# Patient Record
Sex: FEMALE | Race: OTHER
[De-identification: names, ages, dates, MRNs, and addresses within clinical notes are randomized per-mention and may not be internally consistent; named-entity substitution may affect disease eponyms.]

---

## 2017-11-26 ENCOUNTER — HOSPITAL ENCOUNTER (EMERGENCY)
Dept: HOSPITAL 54 - ER | Age: 37
Discharge: HOME | End: 2017-11-26
Payer: COMMERCIAL

## 2017-11-26 VITALS — DIASTOLIC BLOOD PRESSURE: 71 MMHG | SYSTOLIC BLOOD PRESSURE: 134 MMHG

## 2017-11-26 VITALS — WEIGHT: 120 LBS | HEIGHT: 62 IN | BODY MASS INDEX: 22.08 KG/M2

## 2017-11-26 DIAGNOSIS — Z87.442: ICD-10-CM

## 2017-11-26 DIAGNOSIS — N39.0: ICD-10-CM

## 2017-11-26 DIAGNOSIS — Z88.8: ICD-10-CM

## 2017-11-26 DIAGNOSIS — F17.210: ICD-10-CM

## 2017-11-26 DIAGNOSIS — Z91.013: ICD-10-CM

## 2017-11-26 DIAGNOSIS — F10.10: ICD-10-CM

## 2017-11-26 DIAGNOSIS — Z88.2: ICD-10-CM

## 2017-11-26 DIAGNOSIS — R19.7: ICD-10-CM

## 2017-11-26 DIAGNOSIS — R11.2: Primary | ICD-10-CM

## 2017-11-26 DIAGNOSIS — J45.909: ICD-10-CM

## 2017-11-26 LAB
ALBUMIN SERPL BCP-MCNC: 4.3 G/DL (ref 3.4–5)
ALP SERPL-CCNC: 82 U/L (ref 46–116)
ALT SERPL W P-5'-P-CCNC: 21 U/L (ref 12–78)
APPEARANCE UR: (no result)
AST SERPL W P-5'-P-CCNC: 15 U/L (ref 15–37)
BASOPHILS # BLD AUTO: 0 /CMM (ref 0–0.2)
BASOPHILS NFR BLD AUTO: 0.3 % (ref 0–2)
BILIRUB DIRECT SERPL-MCNC: 0.1 MG/DL (ref 0–0.2)
BILIRUB SERPL-MCNC: 0.3 MG/DL (ref 0.2–1)
BILIRUB UR QL STRIP: NEGATIVE
BUN SERPL-MCNC: 12 MG/DL (ref 7–18)
CALCIUM SERPL-MCNC: 9.4 MG/DL (ref 8.5–10.1)
CHLORIDE SERPL-SCNC: 105 MMOL/L (ref 98–107)
CO2 SERPL-SCNC: 28 MMOL/L (ref 21–32)
COLOR UR: YELLOW
CREAT SERPL-MCNC: 0.7 MG/DL (ref 0.6–1.3)
EOSINOPHIL # BLD AUTO: 0 /CMM (ref 0–0.7)
EOSINOPHIL NFR BLD AUTO: 0.1 % (ref 0–6)
GLUCOSE SERPL-MCNC: 120 MG/DL (ref 74–106)
GLUCOSE UR STRIP-MCNC: NEGATIVE MG/DL
HCT VFR BLD AUTO: 43 % (ref 33–45)
HGB BLD-MCNC: 13.9 G/DL (ref 11.5–14.8)
HGB UR QL STRIP: (no result) ERY/UL
KETONES UR STRIP-MCNC: (no result) MG/DL
LEUKOCYTE ESTERASE UR QL STRIP: (no result)
LIPASE SERPL-CCNC: 93 U/L (ref 73–393)
LYMPHOCYTES NFR BLD AUTO: 1.3 /CMM (ref 0.8–4.8)
LYMPHOCYTES NFR BLD AUTO: 12.3 % (ref 20–44)
MCH RBC QN AUTO: 28 PG (ref 26–33)
MCHC RBC AUTO-ENTMCNC: 33 G/DL (ref 31–36)
MCV RBC AUTO: 85 FL (ref 82–100)
MONOCYTES NFR BLD AUTO: 0.2 /CMM (ref 0.1–1.3)
MONOCYTES NFR BLD AUTO: 1.7 % (ref 2–12)
NEUTROPHILS # BLD AUTO: 9.5 /CMM (ref 1.8–8.9)
NEUTROPHILS NFR BLD AUTO: 85.6 % (ref 43–81)
NITRITE UR QL STRIP: NEGATIVE
PH UR STRIP: 8 [PH] (ref 5–8)
PLATELET # BLD AUTO: 323 /CMM (ref 150–450)
POTASSIUM SERPL-SCNC: 3.8 MMOL/L (ref 3.5–5.1)
PROT SERPL-MCNC: 7.8 G/DL (ref 6.4–8.2)
PROT UR QL STRIP: (no result) MG/DL
RBC # BLD AUTO: 5.02 MIL/UL (ref 4–5.2)
RBC #/AREA URNS HPF: (no result) /HPF (ref 0–2)
RDW COEFFICIENT OF VARIATION: 12.5 (ref 11.5–15)
SODIUM SERPL-SCNC: 141 MMOL/L (ref 136–145)
UROBILINOGEN UR STRIP-MCNC: 0.2 EU/DL
WBC #/AREA URNS HPF: (no result) /HPF
WBC #/AREA URNS HPF: (no result) /HPF (ref 0–3)
WBC NRBC COR # BLD AUTO: 11 K/UL (ref 4.3–11)

## 2017-11-26 PROCEDURE — Z7610: HCPCS

## 2017-11-26 PROCEDURE — 80048 BASIC METABOLIC PNL TOTAL CA: CPT

## 2017-11-26 PROCEDURE — 96361 HYDRATE IV INFUSION ADD-ON: CPT

## 2017-11-26 PROCEDURE — 36415 COLL VENOUS BLD VENIPUNCTURE: CPT

## 2017-11-26 PROCEDURE — 99284 EMERGENCY DEPT VISIT MOD MDM: CPT

## 2017-11-26 PROCEDURE — 83690 ASSAY OF LIPASE: CPT

## 2017-11-26 PROCEDURE — 81001 URINALYSIS AUTO W/SCOPE: CPT

## 2017-11-26 PROCEDURE — 96374 THER/PROPH/DIAG INJ IV PUSH: CPT

## 2017-11-26 PROCEDURE — 99406 BEHAV CHNG SMOKING 3-10 MIN: CPT

## 2017-11-26 PROCEDURE — 87086 URINE CULTURE/COLONY COUNT: CPT

## 2017-11-26 PROCEDURE — 85025 COMPLETE CBC W/AUTO DIFF WBC: CPT

## 2017-11-26 PROCEDURE — A4606 OXYGEN PROBE USED W OXIMETER: HCPCS

## 2017-11-26 PROCEDURE — 80076 HEPATIC FUNCTION PANEL: CPT

## 2017-11-26 PROCEDURE — 84703 CHORIONIC GONADOTROPIN ASSAY: CPT

## 2017-11-26 NOTE — NUR
PT. VERBALIZED UNDERSTANDING OF AFTERCARE INSTRUCTIONS.Patient discharged to 
home in stable condition. Written and verbal after care instructions given. 
Patient verbalizes understanding of instruction.

## 2018-05-12 ENCOUNTER — HOSPITAL ENCOUNTER (EMERGENCY)
Dept: HOSPITAL 54 - ER | Age: 38
Discharge: HOME | End: 2018-05-12
Payer: COMMERCIAL

## 2018-05-12 VITALS — SYSTOLIC BLOOD PRESSURE: 113 MMHG | DIASTOLIC BLOOD PRESSURE: 68 MMHG

## 2018-05-12 VITALS — BODY MASS INDEX: 22.63 KG/M2 | HEIGHT: 62 IN | WEIGHT: 123 LBS

## 2018-05-12 DIAGNOSIS — J45.909: ICD-10-CM

## 2018-05-12 DIAGNOSIS — Z88.6: ICD-10-CM

## 2018-05-12 DIAGNOSIS — Z91.013: ICD-10-CM

## 2018-05-12 DIAGNOSIS — Z88.2: ICD-10-CM

## 2018-05-12 DIAGNOSIS — Z88.1: ICD-10-CM

## 2018-05-12 DIAGNOSIS — Z87.442: ICD-10-CM

## 2018-05-12 DIAGNOSIS — N12: Primary | ICD-10-CM

## 2018-05-12 LAB
BASOPHILS # BLD AUTO: 0.1 /CMM (ref 0–0.2)
BASOPHILS NFR BLD AUTO: 0.5 % (ref 0–2)
BILIRUB UR QL STRIP: (no result)
BUN SERPL-MCNC: 18 MG/DL (ref 7–18)
CALCIUM SERPL-MCNC: 9 MG/DL (ref 8.5–10.1)
CHLORIDE SERPL-SCNC: 104 MMOL/L (ref 98–107)
CO2 SERPL-SCNC: 30 MMOL/L (ref 21–32)
CREAT SERPL-MCNC: 0.8 MG/DL (ref 0.6–1.3)
EOSINOPHIL NFR BLD AUTO: 5.9 % (ref 0–6)
GLUCOSE SERPL-MCNC: 89 MG/DL (ref 74–106)
GLUCOSE UR STRIP-MCNC: (no result) MG/DL
HCT VFR BLD AUTO: 39 % (ref 33–45)
HGB BLD-MCNC: 13.3 G/DL (ref 11.5–14.8)
LYMPHOCYTES NFR BLD AUTO: 2.8 /CMM (ref 0.8–4.8)
LYMPHOCYTES NFR BLD AUTO: 26.6 % (ref 20–44)
MCHC RBC AUTO-ENTMCNC: 34 G/DL (ref 31–36)
MCV RBC AUTO: 84 FL (ref 82–100)
MONOCYTES NFR BLD AUTO: 0.5 /CMM (ref 0.1–1.3)
MONOCYTES NFR BLD AUTO: 4.5 % (ref 2–12)
NEUTROPHILS # BLD AUTO: 6.7 /CMM (ref 1.8–8.9)
NEUTROPHILS NFR BLD AUTO: 62.5 % (ref 43–81)
PH UR STRIP: 5 [PH] (ref 5–8)
PLATELET # BLD AUTO: 379 /CMM (ref 150–450)
POTASSIUM SERPL-SCNC: 3.7 MMOL/L (ref 3.5–5.1)
PROT UR QL STRIP: 100 MG/DL
RBC # BLD AUTO: 4.59 MIL/UL (ref 4–5.2)
RDW COEFFICIENT OF VARIATION: 12.6 (ref 11.5–15)
SODIUM SERPL-SCNC: 138 MMOL/L (ref 136–145)
UROBILINOGEN UR STRIP-MCNC: 4 EU/DL
WBC NRBC COR # BLD AUTO: 10.7 K/UL (ref 4.3–11)

## 2018-05-12 PROCEDURE — A4606 OXYGEN PROBE USED W OXIMETER: HCPCS

## 2018-05-12 PROCEDURE — A4216 STERILE WATER/SALINE, 10 ML: HCPCS

## 2018-05-12 PROCEDURE — Z7610: HCPCS

## 2018-06-01 ENCOUNTER — HOSPITAL ENCOUNTER (EMERGENCY)
Dept: HOSPITAL 54 - ER | Age: 38
LOS: 1 days | Discharge: HOME | End: 2018-06-02
Payer: COMMERCIAL

## 2018-06-01 VITALS — WEIGHT: 125 LBS | HEIGHT: 63 IN | BODY MASS INDEX: 22.15 KG/M2

## 2018-06-01 DIAGNOSIS — Z91.013: ICD-10-CM

## 2018-06-01 DIAGNOSIS — J45.909: ICD-10-CM
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
## 2018-06-01 DIAGNOSIS — T78.02XA: Primary | ICD-10-CM

## 2018-06-01 DIAGNOSIS — Z87.442: ICD-10-CM

## 2018-06-01 DIAGNOSIS — J38.4: ICD-10-CM

## 2018-06-01 DIAGNOSIS — Z88.2: ICD-10-CM

## 2018-06-01 DIAGNOSIS — F10.10: ICD-10-CM

## 2018-06-01 DIAGNOSIS — Z88.5: ICD-10-CM

## 2018-06-01 DIAGNOSIS — Z88.6: ICD-10-CM

## 2018-06-01 DIAGNOSIS — Z88.8: ICD-10-CM

## 2018-06-01 PROCEDURE — A4606 OXYGEN PROBE USED W OXIMETER: HCPCS

## 2018-06-01 NOTE — NUR
PT RESTING QUIETLY, AROUSES EASILY TO VOICE. RN TO CONTINUE MONITORING 
PROVIDING SAFETY/COMFORT MEASURES.

## 2018-06-01 NOTE — NUR
18G IV TO L AC X 1 ATTEMPT USING ASEPTIC TECH. IV FLUSHES EASILY WITH NS, NO 
S/S INFILTRATION NOTED AT THIS TIME.

## 2018-06-01 NOTE — NUR
PT AMBULATORY TO ER BED 14. MD AT BEDSIDE. BIB FRIEND C/O ALLERGIC REACTION 
WITH SOB TO EATING SHRIMP X 30 MIN AGO. PT PLACED IN GOWN AND ON CARDIAC 
MONITOR. VSS/RESP EVEN UNLABORED/NAD NOTED/SKIN WARM AND DRY/DENIES 
N-V-D/AFEBRILE/AOX4.

## 2018-06-02 VITALS — SYSTOLIC BLOOD PRESSURE: 120 MMHG | DIASTOLIC BLOOD PRESSURE: 69 MMHG

## 2018-06-02 NOTE — NUR
IV removed. Catheter intact and site benign. Pressure and 4x4 applied to site. 
No bleeding noted. Patient discharged to home in stable condition. Written and 
verbal after care instructions given. Patient verbalizes understanding of 
instruction. Patient ambulatory with a steady gait.

## 2018-06-24 ENCOUNTER — HOSPITAL ENCOUNTER (EMERGENCY)
Dept: HOSPITAL 54 - ER | Age: 38
LOS: 1 days | Discharge: HOME | End: 2018-06-25
Payer: COMMERCIAL

## 2018-06-24 VITALS — BODY MASS INDEX: 23 KG/M2 | HEIGHT: 62 IN | WEIGHT: 125 LBS

## 2018-06-24 DIAGNOSIS — J45.909: ICD-10-CM
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
## 2018-06-24 DIAGNOSIS — Z88.2: ICD-10-CM

## 2018-06-24 DIAGNOSIS — L29.8: Primary | ICD-10-CM

## 2018-06-24 DIAGNOSIS — Z91.013: ICD-10-CM

## 2018-06-24 DIAGNOSIS — Z88.1: ICD-10-CM

## 2018-06-24 DIAGNOSIS — Z88.6: ICD-10-CM

## 2018-06-24 DIAGNOSIS — Z87.442: ICD-10-CM
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
## 2018-06-24 DIAGNOSIS — T78.1XXA: ICD-10-CM

## 2018-06-24 DIAGNOSIS — F10.10: ICD-10-CM

## 2018-06-24 PROCEDURE — Z7610: HCPCS

## 2018-06-24 PROCEDURE — A4606 OXYGEN PROBE USED W OXIMETER: HCPCS

## 2018-06-24 PROCEDURE — 99283 EMERGENCY DEPT VISIT LOW MDM: CPT

## 2018-06-25 VITALS — SYSTOLIC BLOOD PRESSURE: 114 MMHG | DIASTOLIC BLOOD PRESSURE: 70 MMHG

## 2019-11-09 ENCOUNTER — HOSPITAL ENCOUNTER (EMERGENCY)
Dept: HOSPITAL 54 - ER | Age: 39
Discharge: HOME | End: 2019-11-09
Payer: COMMERCIAL

## 2019-11-09 VITALS — WEIGHT: 120 LBS | BODY MASS INDEX: 22.08 KG/M2 | HEIGHT: 62 IN

## 2019-11-09 VITALS — SYSTOLIC BLOOD PRESSURE: 131 MMHG | DIASTOLIC BLOOD PRESSURE: 55 MMHG
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
## 2019-11-09 DIAGNOSIS — A08.39: Primary | ICD-10-CM

## 2019-11-09 DIAGNOSIS — Z87.442: ICD-10-CM

## 2019-11-09 DIAGNOSIS — R11.2: ICD-10-CM

## 2019-11-09 DIAGNOSIS — R51: ICD-10-CM

## 2019-11-09 DIAGNOSIS — Z88.8: ICD-10-CM

## 2019-11-09 DIAGNOSIS — Z88.0: ICD-10-CM

## 2019-11-09 DIAGNOSIS — Z79.899: ICD-10-CM

## 2019-11-09 DIAGNOSIS — Z88.2: ICD-10-CM

## 2019-11-09 DIAGNOSIS — J45.909: ICD-10-CM

## 2019-11-09 DIAGNOSIS — F10.10: ICD-10-CM

## 2019-11-09 DIAGNOSIS — R19.7: ICD-10-CM

## 2019-11-09 DIAGNOSIS — Z91.013: ICD-10-CM

## 2019-11-09 LAB
ALBUMIN SERPL BCP-MCNC: 3.8 G/DL (ref 3.4–5)
ALP SERPL-CCNC: 68 U/L (ref 46–116)
ALT SERPL W P-5'-P-CCNC: 20 U/L (ref 12–78)
AST SERPL W P-5'-P-CCNC: 14 U/L (ref 15–37)
BASOPHILS # BLD AUTO: 0 /CMM (ref 0–0.2)
BASOPHILS NFR BLD AUTO: 0.4 % (ref 0–2)
BILIRUB DIRECT SERPL-MCNC: 0.1 MG/DL (ref 0–0.2)
BILIRUB SERPL-MCNC: 0.2 MG/DL (ref 0.2–1)
BUN SERPL-MCNC: 13 MG/DL (ref 7–18)
CALCIUM SERPL-MCNC: 8.7 MG/DL (ref 8.5–10.1)
CHLORIDE SERPL-SCNC: 102 MMOL/L (ref 98–107)
CO2 SERPL-SCNC: 25 MMOL/L (ref 21–32)
CREAT SERPL-MCNC: 0.7 MG/DL (ref 0.6–1.3)
EOSINOPHIL NFR BLD AUTO: 2.6 % (ref 0–6)
GLUCOSE SERPL-MCNC: 113 MG/DL (ref 74–106)
HCT VFR BLD AUTO: 40 % (ref 33–45)
HGB BLD-MCNC: 13.6 G/DL (ref 11.5–14.8)
LIPASE SERPL-CCNC: 99 U/L (ref 73–393)
LYMPHOCYTES NFR BLD AUTO: 1.9 /CMM (ref 0.8–4.8)
LYMPHOCYTES NFR BLD AUTO: 16.7 % (ref 20–44)
MCHC RBC AUTO-ENTMCNC: 34 G/DL (ref 31–36)
MCV RBC AUTO: 86 FL (ref 82–100)
MONOCYTES NFR BLD AUTO: 0.6 /CMM (ref 0.1–1.3)
MONOCYTES NFR BLD AUTO: 5.1 % (ref 2–12)
NEUTROPHILS # BLD AUTO: 8.3 /CMM (ref 1.8–8.9)
NEUTROPHILS NFR BLD AUTO: 75.2 % (ref 43–81)
PH UR STRIP: 7 [PH] (ref 5–8)
PLATELET # BLD AUTO: 362 /CMM (ref 150–450)
POTASSIUM SERPL-SCNC: 3.7 MMOL/L (ref 3.5–5.1)
PROT SERPL-MCNC: 7.5 G/DL (ref 6.4–8.2)
RBC # BLD AUTO: 4.67 MIL/UL (ref 4–5.2)
RBC #/AREA URNS HPF: (no result) /HPF (ref 0–2)
SODIUM SERPL-SCNC: 136 MMOL/L (ref 136–145)
UROBILINOGEN UR STRIP-MCNC: 1 EU/DL
WBC #/AREA URNS HPF: (no result) /HPF (ref 0–3)
WBC NRBC COR # BLD AUTO: 11.1 K/UL (ref 4.3–11)

## 2019-11-09 PROCEDURE — 96374 THER/PROPH/DIAG INJ IV PUSH: CPT

## 2019-11-09 PROCEDURE — 80048 BASIC METABOLIC PNL TOTAL CA: CPT

## 2019-11-09 PROCEDURE — 80076 HEPATIC FUNCTION PANEL: CPT

## 2019-11-09 PROCEDURE — 87804 INFLUENZA ASSAY W/OPTIC: CPT

## 2019-11-09 PROCEDURE — 84703 CHORIONIC GONADOTROPIN ASSAY: CPT

## 2019-11-09 PROCEDURE — 36415 COLL VENOUS BLD VENIPUNCTURE: CPT

## 2019-11-09 PROCEDURE — 85025 COMPLETE CBC W/AUTO DIFF WBC: CPT

## 2019-11-09 PROCEDURE — 96361 HYDRATE IV INFUSION ADD-ON: CPT

## 2019-11-09 PROCEDURE — 83690 ASSAY OF LIPASE: CPT

## 2019-11-09 PROCEDURE — 96376 TX/PRO/DX INJ SAME DRUG ADON: CPT

## 2019-11-09 PROCEDURE — 81001 URINALYSIS AUTO W/SCOPE: CPT

## 2019-11-09 PROCEDURE — 99283 EMERGENCY DEPT VISIT LOW MDM: CPT

## 2019-11-09 PROCEDURE — 87086 URINE CULTURE/COLONY COUNT: CPT

## 2019-11-09 NOTE — NUR
PT BIBFAMILY C/O GENERALIZED BODYACHES, HEADACHE, N/V/D SINCE 11AM. PT AOX4. 
RESP EVEN AND UNLABORED. AFEBRILE. PT ON MONITOR IN BED7 WITH FAMILY AT 
BEDSIDE. WILL CONTINUE TO MONITOR.

## 2024-08-23 ENCOUNTER — HOSPITAL ENCOUNTER (EMERGENCY)
Dept: HOSPITAL 54 - ER | Age: 44
Discharge: HOME | End: 2024-08-23
Payer: COMMERCIAL

## 2024-08-23 VITALS — HEIGHT: 62 IN | BODY MASS INDEX: 24.84 KG/M2 | WEIGHT: 135 LBS

## 2024-08-23 VITALS — DIASTOLIC BLOOD PRESSURE: 71 MMHG | OXYGEN SATURATION: 99 % | TEMPERATURE: 98 F | SYSTOLIC BLOOD PRESSURE: 118 MMHG

## 2024-08-23 DIAGNOSIS — Z88.2: ICD-10-CM

## 2024-08-23 DIAGNOSIS — J45.909: ICD-10-CM

## 2024-08-23 DIAGNOSIS — Z91.013: ICD-10-CM

## 2024-08-23 DIAGNOSIS — Z91.040: ICD-10-CM

## 2024-08-23 DIAGNOSIS — Z88.0: ICD-10-CM
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
## 2024-08-23 DIAGNOSIS — S91.311D: Primary | ICD-10-CM

## 2024-08-23 DIAGNOSIS — Z88.8: ICD-10-CM

## 2024-08-23 DIAGNOSIS — Z87.442: ICD-10-CM

## 2024-08-23 DIAGNOSIS — Z48.02: ICD-10-CM
